# Patient Record
Sex: MALE | Race: WHITE | Employment: FULL TIME | URBAN - METROPOLITAN AREA
[De-identification: names, ages, dates, MRNs, and addresses within clinical notes are randomized per-mention and may not be internally consistent; named-entity substitution may affect disease eponyms.]

---

## 2021-11-30 ENCOUNTER — OFFICE VISIT (OUTPATIENT)
Dept: ORTHOPEDIC SURGERY | Age: 46
End: 2021-11-30
Payer: COMMERCIAL

## 2021-11-30 VITALS — WEIGHT: 200 LBS | BODY MASS INDEX: 28 KG/M2 | HEIGHT: 71 IN

## 2021-11-30 DIAGNOSIS — R20.2 NUMBNESS AND TINGLING: ICD-10-CM

## 2021-11-30 DIAGNOSIS — M25.511 ACUTE PAIN OF RIGHT SHOULDER: Primary | ICD-10-CM

## 2021-11-30 DIAGNOSIS — R20.0 NUMBNESS AND TINGLING: ICD-10-CM

## 2021-11-30 PROCEDURE — 99203 OFFICE O/P NEW LOW 30 MIN: CPT | Performed by: ORTHOPAEDIC SURGERY
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
## 2021-12-15 ENCOUNTER — OFFICE VISIT (OUTPATIENT)
Dept: ORTHOPEDIC SURGERY | Age: 46
End: 2021-12-15
Payer: COMMERCIAL

## 2021-12-15 DIAGNOSIS — M25.511 ACUTE PAIN OF RIGHT SHOULDER: ICD-10-CM

## 2021-12-15 DIAGNOSIS — M54.12 CERVICAL RADICULITIS: Primary | ICD-10-CM

## 2021-12-15 PROCEDURE — 99214 OFFICE O/P EST MOD 30 MIN: CPT | Performed by: ORTHOPAEDIC SURGERY

## 2021-12-15 RX ORDER — METHYLPREDNISOLONE 4 MG/1
TABLET ORAL
Qty: 1 DOSE PACK | Refills: 0 | Status: SHIPPED | OUTPATIENT
Start: 2021-12-15

## 2021-12-15 NOTE — PROGRESS NOTES
Yamilet Mejias (: 1975) is a 55 y.o. male, patient, here for evaluation of the following chief complaint(s):  Shoulder Pain (shoulder pain)       HPI:    Patient returns to the office now status post MRI evaluation of his right shoulder. Patient continues to have some level of discomfort of his right shoulder. He states the pain does tend to be centered somewhere around the shoulder but occasionally does note discomfort that radiates all the way down to the arm with some level of numbness and tingling into the hand region. He particularly notices this at nighttime. No Known Allergies    No current outpatient medications on file. No current facility-administered medications for this visit. No past medical history on file. No past surgical history on file. No family history on file. Social History     Socioeconomic History    Marital status:      Spouse name: Not on file    Number of children: Not on file    Years of education: Not on file    Highest education level: Not on file   Occupational History    Not on file   Tobacco Use    Smoking status: Not on file    Smokeless tobacco: Not on file   Substance and Sexual Activity    Alcohol use: Not on file    Drug use: Not on file    Sexual activity: Not on file   Other Topics Concern    Not on file   Social History Narrative    Not on file     Social Determinants of Health     Financial Resource Strain:     Difficulty of Paying Living Expenses: Not on file   Food Insecurity:     Worried About Running Out of Food in the Last Year: Not on file    Janice of Food in the Last Year: Not on file   Transportation Needs:     Lack of Transportation (Medical): Not on file    Lack of Transportation (Non-Medical):  Not on file   Physical Activity:     Days of Exercise per Week: Not on file    Minutes of Exercise per Session: Not on file   Stress:     Feeling of Stress : Not on file   Social Connections:     Frequency of Communication with Friends and Family: Not on file    Frequency of Social Gatherings with Friends and Family: Not on file    Attends Confucianism Services: Not on file    Active Member of Clubs or Organizations: Not on file    Attends Club or Organization Meetings: Not on file    Marital Status: Not on file   Intimate Partner Violence:     Fear of Current or Ex-Partner: Not on file    Emotionally Abused: Not on file    Physically Abused: Not on file    Sexually Abused: Not on file   Housing Stability:     Unable to Pay for Housing in the Last Year: Not on file    Number of [place] in the Last Year: Not on file    Unstable Housing in the Last Year: Not on file       Review of Systems   Musculoskeletal:        Shoulder pain       Vitals: There were no vitals taken for this visit. There is no height or weight on file to calculate BMI. Ortho Exam     Right shoulder: No shoulder girdle atrophy . There is no soft tissue swelling, ecchymosis, abrasions or lacerations. Active range of motion of the shoulder is full with forward flexion, lateral abduction and external rotation. Internal rotation is to the SI joint and is painful. Passive range of motion is full with a positive impingement sign and a painful Mendoza sign. Rotator cuff strength is painful to evaluate and is at 4+/5 with forward flexion and lateral abduction. External rotational strength is maintained. There is no crepitation about the joint. Palpation of the [facility] [place] joint does not reproduce discomfort, and there is no pain elicited with cross-body adduction. Strength of the extremity is 5/5 at biceps/triceps/wrist extension and is comparable to the contralateral side. Cervical spine: Patient has full range of motion of the cervical spine. He has mild tenderness noted to palpation along the right paraspinal musculature. He has a negative Spurling sign. He has normal strength with flexion and extension.   Neurovascular examination is intact. MRI evaluation shows some mild changes noted along the superior labrum but I do not appreciate any displaced labral tissue suggesting any major labral pathology. Rotator cuff appears to be intact. Small cyst noted in the anterior inferior aspect of the proximal humerus nonpathologic  ASSESSMENT/PLAN:    I have gone over the MRI. His MRI does not show any acute pathology. His exam and history once again are somewhat consistent with cervical radiculitis and I was a little concerned with this after our first encounter. With this MRI, I would suggest physical therapy more dedicated towards cervical radiculitis. He is in agreement with this. I have also talked him about a Medrol Dosepak I think this would be appropriate moving forward. He would like to consider this as well. Patient is to return to the office in 4 weeks if he has no improvement.         Rosanna Worrell MD

## 2022-01-17 ENCOUNTER — OFFICE VISIT (OUTPATIENT)
Dept: ORTHOPEDIC SURGERY | Age: 47
End: 2022-01-17
Payer: COMMERCIAL

## 2022-01-17 DIAGNOSIS — M54.12 CERVICAL RADICULITIS: ICD-10-CM

## 2022-01-17 DIAGNOSIS — R20.2 NUMBNESS AND TINGLING: ICD-10-CM

## 2022-01-17 DIAGNOSIS — R20.0 NUMBNESS AND TINGLING: ICD-10-CM

## 2022-01-17 DIAGNOSIS — M25.511 ACUTE PAIN OF RIGHT SHOULDER: Primary | ICD-10-CM

## 2022-01-17 PROCEDURE — 99213 OFFICE O/P EST LOW 20 MIN: CPT | Performed by: ORTHOPAEDIC SURGERY

## 2022-01-17 NOTE — PROGRESS NOTES
Rasheeda Lui (: 1975) is a 55 y.o. male, patient, here for evaluation of the following chief complaint(s):  Elbow Pain (elbow pain)       HPI:    Patient presents the office today with right upper extremity pain. This patient who I saw in the office late November. We diagnosed him with MRI findings of a superior labral tear of the shoulder. He is currently doing relatively well in regards to the shoulder pain. However, patient continues to have discomfort across his arm particularly with what he describes as a burning sensation into the forearm to hand region. He notices this a lot during his nighttime sleep. He states that the digits that seem to be mostly involved are all of them although he has a little bit of more finding into his ring finger. He does not notice neck pain. Sounds as if he has had a prior diagnosis of mild to moderate carpal tunnel syndrome based on his prior history of EMG test    No Known Allergies    Current Outpatient Medications   Medication Sig    methylPREDNISolone (MEDROL DOSEPACK) 4 mg tablet Per dose pack instructions    methylPREDNISolone (MEDROL DOSEPACK) 4 mg tablet Per dose pack instructions     No current facility-administered medications for this visit. No past medical history on file. No past surgical history on file. No family history on file.      Social History     Socioeconomic History    Marital status:      Spouse name: Not on file    Number of children: Not on file    Years of education: Not on file    Highest education level: Not on file   Occupational History    Not on file   Tobacco Use    Smoking status: Not on file    Smokeless tobacco: Not on file   Substance and Sexual Activity    Alcohol use: Not on file    Drug use: Not on file    Sexual activity: Not on file   Other Topics Concern    Not on file   Social History Narrative    Not on file     Social Determinants of Health     Financial Resource Strain:    Julieth Danielle Difficulty of Paying Living Expenses: Not on file   Food Insecurity:     Worried About Running Out of Food in the Last Year: Not on file    Ran Out of Food in the Last Year: Not on file   Transportation Needs:     Lack of Transportation (Medical): Not on file    Lack of Transportation (Non-Medical): Not on file   Physical Activity:     Days of Exercise per Week: Not on file    Minutes of Exercise per Session: Not on file   Stress:     Feeling of Stress : Not on file   Social Connections:     Frequency of Communication with Friends and Family: Not on file    Frequency of Social Gatherings with Friends and Family: Not on file    Attends Jainism Services: Not on file    Active Member of [address] [place] [address] [employer] or Organizations: Not on file    Attends Club or Organization Meetings: Not on file    Marital Status: Not on file   Intimate Partner Violence:     Fear of Current or Ex-Partner: Not on file    Emotionally Abused: Not on file    Physically Abused: Not on file    Sexually Abused: Not on file   Housing Stability:     Unable to Pay for Housing in the Last Year: Not on file    Number of [place] in the Last Year: Not on file    Unstable Housing in the Last Year: Not on file       Review of Systems   Musculoskeletal:        Elbow pain       Vitals: There were no vitals taken for this visit. There is no height or weight on file to calculate BMI. Ortho Exam     Right upper extremity: Patient has full range of motion of the shoulder. Patient has no pain with manipulation of the shoulder. Strength is maintained. There is no swelling noted distally. He has full range of motion of the right elbow. He has a mildly positive Tinel's sign and negative hyperflexion test.  There is no atrophy. He has no tenderness palpation at his forearm musculature. His Tinel's sign at the carpal tunnel is negative. He has normal strength noted distally. He has no atrophy. Neurovascular examination is intact.     Cervical spine: Patient has full range of motion of cervical spine. He has mild tenderness with lateral rotation but he has no positive Spurling sign. Patient has normal bicep, tricep and wrist extensor strength. Neurovascular examination is intact. Patient has had prior x-ray of cervical spine which were reveals no significant loss of disc space overall alignment is acceptable    ASSESSMENT/PLAN:    Patient  returns to the office with recurrent right upper extremity pain. I do not believe his current level of pain is secondary to the finding of the shoulder. There possibly could be a neurological component to the pain. After discussing treatment options moving forward we will like to proceed with a EMG study of his right upper extremity.   Patient agrees and is to return to the office after the Meghan Pennington MD

## 2023-05-15 RX ORDER — METHYLPREDNISOLONE 4 MG/1
TABLET ORAL
COMMUNITY
Start: 2021-12-15